# Patient Record
Sex: MALE | Race: WHITE | NOT HISPANIC OR LATINO | Employment: FULL TIME | URBAN - METROPOLITAN AREA
[De-identification: names, ages, dates, MRNs, and addresses within clinical notes are randomized per-mention and may not be internally consistent; named-entity substitution may affect disease eponyms.]

---

## 2017-02-16 ENCOUNTER — APPOINTMENT (OUTPATIENT)
Dept: CT IMAGING | Facility: HOSPITAL | Age: 32
End: 2017-02-16

## 2017-02-16 ENCOUNTER — HOSPITAL ENCOUNTER (EMERGENCY)
Facility: HOSPITAL | Age: 32
Discharge: HOME | End: 2017-02-16
Attending: EMERGENCY MEDICINE | Admitting: EMERGENCY MEDICINE

## 2017-02-16 ENCOUNTER — APPOINTMENT (OUTPATIENT)
Dept: GENERAL RADIOLOGY | Facility: HOSPITAL | Age: 32
End: 2017-02-16

## 2017-02-16 VITALS
HEIGHT: 69 IN | OXYGEN SATURATION: 97 % | SYSTOLIC BLOOD PRESSURE: 133 MMHG | RESPIRATION RATE: 18 BRPM | HEART RATE: 77 BPM | DIASTOLIC BLOOD PRESSURE: 90 MMHG | TEMPERATURE: 97.2 F | WEIGHT: 130 LBS | BODY MASS INDEX: 19.26 KG/M2

## 2017-02-16 DIAGNOSIS — F10.239 ALCOHOL DEPENDENCE WITH WITHDRAWAL WITH COMPLICATION (HCC): ICD-10-CM

## 2017-02-16 DIAGNOSIS — R55 CONVULSIVE SYNCOPE: Primary | ICD-10-CM

## 2017-02-16 LAB
ALBUMIN SERPL-MCNC: 4.2 G/DL (ref 3.5–5.2)
ALBUMIN/GLOB SERPL: 1.2 G/DL
ALP SERPL-CCNC: 64 U/L (ref 39–117)
ALT SERPL W P-5'-P-CCNC: 48 U/L (ref 1–41)
AMPHET+METHAMPHET UR QL: NEGATIVE
ANION GAP SERPL CALCULATED.3IONS-SCNC: 13.4 MMOL/L
AST SERPL-CCNC: 74 U/L (ref 1–40)
BACTERIA UR QL AUTO: ABNORMAL /HPF
BARBITURATES UR QL SCN: NEGATIVE
BASOPHILS # BLD AUTO: 0.02 10*3/MM3 (ref 0–0.2)
BASOPHILS NFR BLD AUTO: 0.4 % (ref 0–1.5)
BENZODIAZ UR QL SCN: NEGATIVE
BILIRUB SERPL-MCNC: 0.8 MG/DL (ref 0.1–1.2)
BILIRUB UR QL STRIP: NEGATIVE
BUN BLD-MCNC: 10 MG/DL (ref 6–20)
BUN/CREAT SERPL: 13.3 (ref 7–25)
CALCIUM SPEC-SCNC: 9.4 MG/DL (ref 8.6–10.5)
CANNABINOIDS SERPL QL: NEGATIVE
CHLORIDE SERPL-SCNC: 94 MMOL/L (ref 98–107)
CLARITY UR: CLEAR
CO2 SERPL-SCNC: 25.6 MMOL/L (ref 22–29)
COCAINE UR QL: NEGATIVE
COLOR UR: ABNORMAL
CREAT BLD-MCNC: 0.75 MG/DL (ref 0.76–1.27)
D DIMER PPP FEU-MCNC: 0.44 MCGFEU/ML (ref 0–0.49)
DEPRECATED RDW RBC AUTO: 44.9 FL (ref 37–54)
EOSINOPHIL # BLD AUTO: 0.2 10*3/MM3 (ref 0–0.7)
EOSINOPHIL NFR BLD AUTO: 4.1 % (ref 0.3–6.2)
ERYTHROCYTE [DISTWIDTH] IN BLOOD BY AUTOMATED COUNT: 12.8 % (ref 11.5–14.5)
ETHANOL BLD-MCNC: <10 MG/DL (ref 0–10)
ETHANOL UR QL: <0.01 %
GFR SERPL CREATININE-BSD FRML MDRD: 120 ML/MIN/1.73
GLOBULIN UR ELPH-MCNC: 3.4 GM/DL
GLUCOSE BLD-MCNC: 124 MG/DL (ref 65–99)
GLUCOSE UR STRIP-MCNC: NEGATIVE MG/DL
HCT VFR BLD AUTO: 38.1 % (ref 40.4–52.2)
HGB BLD-MCNC: 13.1 G/DL (ref 13.7–17.6)
HGB UR QL STRIP.AUTO: NEGATIVE
HYALINE CASTS UR QL AUTO: ABNORMAL /LPF
IMM GRANULOCYTES # BLD: 0 10*3/MM3 (ref 0–0.03)
IMM GRANULOCYTES NFR BLD: 0 % (ref 0–0.5)
INR PPP: 1.1 (ref 0.9–1.1)
KETONES UR QL STRIP: ABNORMAL
LEUKOCYTE ESTERASE UR QL STRIP.AUTO: ABNORMAL
LYMPHOCYTES # BLD AUTO: 0.88 10*3/MM3 (ref 0.9–4.8)
LYMPHOCYTES NFR BLD AUTO: 18.2 % (ref 19.6–45.3)
MAGNESIUM SERPL-MCNC: 1.5 MG/DL (ref 1.6–2.6)
MCH RBC QN AUTO: 33.2 PG (ref 27–32.7)
MCHC RBC AUTO-ENTMCNC: 34.4 G/DL (ref 32.6–36.4)
MCV RBC AUTO: 96.7 FL (ref 79.8–96.2)
METHADONE UR QL SCN: NEGATIVE
MONOCYTES # BLD AUTO: 0.51 10*3/MM3 (ref 0.2–1.2)
MONOCYTES NFR BLD AUTO: 10.5 % (ref 5–12)
MUCOUS THREADS URNS QL MICRO: ABNORMAL /HPF
NEUTROPHILS # BLD AUTO: 3.23 10*3/MM3 (ref 1.9–8.1)
NEUTROPHILS NFR BLD AUTO: 66.8 % (ref 42.7–76)
NITRITE UR QL STRIP: NEGATIVE
OPIATES UR QL: NEGATIVE
OXYCODONE UR QL SCN: NEGATIVE
PH UR STRIP.AUTO: 6.5 [PH] (ref 5–8)
PLATELET # BLD AUTO: 170 10*3/MM3 (ref 140–500)
PMV BLD AUTO: 10.4 FL (ref 6–12)
POTASSIUM BLD-SCNC: 3.5 MMOL/L (ref 3.5–5.2)
PROT SERPL-MCNC: 7.6 G/DL (ref 6–8.5)
PROT UR QL STRIP: ABNORMAL
PROTHROMBIN TIME: 13.8 SECONDS (ref 11.7–14.2)
RBC # BLD AUTO: 3.94 10*6/MM3 (ref 4.6–6)
RBC # UR: ABNORMAL /HPF
REF LAB TEST METHOD: ABNORMAL
SODIUM BLD-SCNC: 133 MMOL/L (ref 136–145)
SP GR UR STRIP: 1.02 (ref 1–1.03)
SQUAMOUS #/AREA URNS HPF: ABNORMAL /HPF
UROBILINOGEN UR QL STRIP: ABNORMAL
WBC NRBC COR # BLD: 4.84 10*3/MM3 (ref 4.5–10.7)
WBC UR QL AUTO: ABNORMAL /HPF

## 2017-02-16 PROCEDURE — 87086 URINE CULTURE/COLONY COUNT: CPT | Performed by: EMERGENCY MEDICINE

## 2017-02-16 PROCEDURE — 80307 DRUG TEST PRSMV CHEM ANLYZR: CPT | Performed by: EMERGENCY MEDICINE

## 2017-02-16 PROCEDURE — 83735 ASSAY OF MAGNESIUM: CPT | Performed by: EMERGENCY MEDICINE

## 2017-02-16 PROCEDURE — 81001 URINALYSIS AUTO W/SCOPE: CPT | Performed by: EMERGENCY MEDICINE

## 2017-02-16 PROCEDURE — 85379 FIBRIN DEGRADATION QUANT: CPT | Performed by: EMERGENCY MEDICINE

## 2017-02-16 PROCEDURE — 90791 PSYCH DIAGNOSTIC EVALUATION: CPT

## 2017-02-16 PROCEDURE — 99285 EMERGENCY DEPT VISIT HI MDM: CPT

## 2017-02-16 PROCEDURE — 96365 THER/PROPH/DIAG IV INF INIT: CPT

## 2017-02-16 PROCEDURE — 96375 TX/PRO/DX INJ NEW DRUG ADDON: CPT

## 2017-02-16 PROCEDURE — 96367 TX/PROPH/DG ADDL SEQ IV INF: CPT

## 2017-02-16 PROCEDURE — 96361 HYDRATE IV INFUSION ADD-ON: CPT

## 2017-02-16 PROCEDURE — 85610 PROTHROMBIN TIME: CPT | Performed by: EMERGENCY MEDICINE

## 2017-02-16 PROCEDURE — 71020 HC CHEST PA AND LATERAL: CPT

## 2017-02-16 PROCEDURE — 80053 COMPREHEN METABOLIC PANEL: CPT | Performed by: EMERGENCY MEDICINE

## 2017-02-16 PROCEDURE — 25810000003 DEXTROSE-NACL PER 500 ML: Performed by: EMERGENCY MEDICINE

## 2017-02-16 PROCEDURE — 25010000002 LORAZEPAM PER 2 MG: Performed by: EMERGENCY MEDICINE

## 2017-02-16 PROCEDURE — 25010000002 THIAMINE PER 100 MG: Performed by: EMERGENCY MEDICINE

## 2017-02-16 PROCEDURE — 93010 ELECTROCARDIOGRAM REPORT: CPT | Performed by: INTERNAL MEDICINE

## 2017-02-16 PROCEDURE — 93005 ELECTROCARDIOGRAM TRACING: CPT | Performed by: EMERGENCY MEDICINE

## 2017-02-16 PROCEDURE — 96366 THER/PROPH/DIAG IV INF ADDON: CPT

## 2017-02-16 PROCEDURE — 25010000002 MAGNESIUM SULFATE PER 500 MG OF MAGNESIUM: Performed by: EMERGENCY MEDICINE

## 2017-02-16 PROCEDURE — 70450 CT HEAD/BRAIN W/O DYE: CPT

## 2017-02-16 PROCEDURE — 25010000003 LEVETIRACETAM IN NACL 0.75% 1000 MG/100ML SOLUTION: Performed by: EMERGENCY MEDICINE

## 2017-02-16 PROCEDURE — 85025 COMPLETE CBC W/AUTO DIFF WBC: CPT | Performed by: EMERGENCY MEDICINE

## 2017-02-16 RX ORDER — LIDOCAINE HYDROCHLORIDE AND EPINEPHRINE 10; 10 MG/ML; UG/ML
10 INJECTION, SOLUTION INFILTRATION; PERINEURAL ONCE
Status: COMPLETED | OUTPATIENT
Start: 2017-02-16 | End: 2017-02-16

## 2017-02-16 RX ORDER — LORAZEPAM 2 MG/ML
0.5 INJECTION INTRAMUSCULAR ONCE
Status: COMPLETED | OUTPATIENT
Start: 2017-02-16 | End: 2017-02-16

## 2017-02-16 RX ORDER — OXYCODONE AND ACETAMINOPHEN 10; 325 MG/1; MG/1
1 TABLET ORAL EVERY 6 HOURS PRN
COMMUNITY

## 2017-02-16 RX ORDER — LIDOCAINE HYDROCHLORIDE AND EPINEPHRINE 10; 10 MG/ML; UG/ML
INJECTION, SOLUTION INFILTRATION; PERINEURAL
Status: COMPLETED
Start: 2017-02-16 | End: 2017-02-16

## 2017-02-16 RX ORDER — CHLORDIAZEPOXIDE HYDROCHLORIDE 25 MG/1
25 CAPSULE, GELATIN COATED ORAL 3 TIMES DAILY PRN
Qty: 30 CAPSULE | Refills: 0 | Status: SHIPPED | OUTPATIENT
Start: 2017-02-16

## 2017-02-16 RX ORDER — LEVETIRACETAM 10 MG/ML
1000 INJECTION INTRAVASCULAR ONCE
Status: COMPLETED | OUTPATIENT
Start: 2017-02-16 | End: 2017-02-16

## 2017-02-16 RX ORDER — SODIUM CHLORIDE 0.9 % (FLUSH) 0.9 %
10 SYRINGE (ML) INJECTION AS NEEDED
Status: DISCONTINUED | OUTPATIENT
Start: 2017-02-16 | End: 2017-02-17 | Stop reason: HOSPADM

## 2017-02-16 RX ADMIN — LEVETIRACETAM 1000 MG: 10 INJECTION INTRAVENOUS at 20:07

## 2017-02-16 RX ADMIN — FOLIC ACID 1000 ML/HR: 5 INJECTION, SOLUTION INTRAMUSCULAR; INTRAVENOUS; SUBCUTANEOUS at 17:25

## 2017-02-16 RX ADMIN — LORAZEPAM 0.5 MG: 2 INJECTION, SOLUTION INTRAMUSCULAR; INTRAVENOUS at 20:07

## 2017-02-16 RX ADMIN — SODIUM CHLORIDE 1000 ML: 9 INJECTION, SOLUTION INTRAVENOUS at 15:34

## 2017-02-16 RX ADMIN — LIDOCAINE HYDROCHLORIDE AND EPINEPHRINE 10 ML: 10; 10 INJECTION, SOLUTION INFILTRATION; PERINEURAL at 17:24

## 2017-02-16 NOTE — ED NOTES
Patient states he passed out while at [facility], hematoma present on occipital lobe with mild bleeding. Head wrapped with 4x4 and kerlix.      Clarissa Brice RN  02/16/17 2640

## 2017-02-16 NOTE — ED PROVIDER NOTES
Laceration Repair  Date/Time: 2/16/2017 5:05 PM  Performed by: TAYLOR SUERO  Authorized by: PHIL HANDY   Consent: Verbal consent obtained.  Risks and benefits: risks, benefits and alternatives were discussed  Consent given by: patient  Patient understanding: patient states understanding of the procedure being performed  Required items: required blood products, implants, devices, and special equipment available  Patient identity confirmed: verbally with patient and arm band  Body area: head/neck  Location details: scalp  Laceration length: 5 cm  Foreign bodies: no foreign bodies  Tendon involvement: none  Nerve involvement: none  Vascular damage: no  Anesthesia: local infiltration    Anesthesia:  Anesthesia: local infiltration  Local Anesthetic: lidocaine 1% with epinephrine   Anesthetic total: 10 mL  Sedation:  Patient sedated: no    Preparation: Patient was prepped and draped in the usual sterile fashion.  Irrigation solution: saline  Irrigation method: jet lavage  Amount of cleaning: standard  Debridement: none  Degree of undermining: none  Skin closure: staples (6 staples)  Approximation: close  Approximation difficulty: simple  Patient tolerance: Patient tolerated the procedure well with no immediate complications        Documentation assistance provided by russ Stone for MARILYN Suero. Information recorded by the scribe was done at my direction and has been verified and validated by me.      Francia Stone  02/16/17 1712       JACKY Rodriguez  02/16/17 7186

## 2017-02-16 NOTE — ED NOTES
SYNCOPAL EPISODE AT [facility]. PT FELL DURING THE EPISODE AND HIT HEAD ON FLOOR. PT HAS LACERATION ON BACK OF HEAD.      Fidelina Bullock RN  02/16/17 6867

## 2017-02-16 NOTE — ED PROVIDER NOTES
EMERGENCY DEPARTMENT ENCOUNTER    CHIEF COMPLAINT  Chief Complaint: syncope  History given by:  Patient   History limited by: nothing  Room Number: 17/17  PMD: No Known Provider      HPI:  Pt is a 32 y.o. male who presents s/p syncopal episode, today while shopping, with a laceration to the back of his head. Pt states he remembers reaching for an item on the shelf when his vision became blurred and he fell backward, and he does not remember anything after that. Pt denies chest pain, recent illness or any hx of similar events.     Onset: gradual  Timing: episodic  Intensity/Severity:  moderate  Progression: resolved  Associated Symptoms: blurred vision prior to event  Aggravating Factors: unknown  Alleviating Factors: unkonwn  Previous Episodes: none  Treatment before arrival: none    PAST MEDICAL HISTORY  Active Ambulatory Problems     Diagnosis Date Noted   • No Active Ambulatory Problems     Resolved Ambulatory Problems     Diagnosis Date Noted   • No Resolved Ambulatory Problems     Past Medical History   Diagnosis Date   • Fracture of foot        PAST SURGICAL HISTORY  Past Surgical History   Procedure Laterality Date   • Foot fracture surgery         FAMILY HISTORY  History reviewed. No pertinent family history.    SOCIAL HISTORY  Social History     Social History   • Marital status: Single     Spouse name: N/A   • Number of children: N/A   • Years of education: N/A     Occupational History   • Not on file.     Social History Main Topics   • Smoking status: Never Smoker   • Smokeless tobacco: Not on file   • Alcohol use Yes   • Drug use: No   • Sexual activity: Not on file     Other Topics Concern   • Not on file     Social History Narrative   • No narrative on file       ALLERGIES  Review of patient's allergies indicates no known allergies.    REVIEW OF SYSTEMS  Review of Systems   Constitutional: Negative for chills and fatigue.   HENT: Negative for congestion, rhinorrhea and sore throat.    Eyes: Negative  for pain.   Respiratory: Negative for cough, shortness of breath and wheezing.    Cardiovascular: Negative for chest pain, palpitations and leg swelling.   Gastrointestinal: Negative for abdominal pain, diarrhea, nausea and vomiting.   Genitourinary: Negative for difficulty urinating, dysuria, flank pain and frequency.   Musculoskeletal: Negative for arthralgias, myalgias, neck pain and neck stiffness.   Skin: Positive for wound (laceration to posterior scalp).   Neurological: Positive for syncope. Negative for dizziness, speech difficulty, weakness, light-headedness, numbness and headaches.   Psychiatric/Behavioral: Negative.    All other systems reviewed and are negative.      PHYSICAL EXAM  ED Triage Vitals   Temp Heart Rate Resp BP SpO2   02/16/17 1508 02/16/17 1508 02/16/17 1508 02/16/17 1508 02/16/17 1508   97.2 °F (36.2 °C) 78 18 130/90 97 %      Temp src Heart Rate Source Patient Position BP Location FiO2 (%)   -- -- -- -- --              Physical Exam   Constitutional: He is oriented to person, place, and time and well-developed, well-nourished, and in no distress.   HENT:   Head: Normocephalic. Head is with laceration.   10 cm laceration to the L occipital scalp   Eyes: EOM are normal. Pupils are equal, round, and reactive to light.   Neck: Normal range of motion. Neck supple.   Cardiovascular: Normal rate, regular rhythm and normal heart sounds.    Pulmonary/Chest: Effort normal and breath sounds normal. No respiratory distress.   Abdominal: Soft. There is no tenderness. There is no rebound and no guarding.   Musculoskeletal: Normal range of motion. He exhibits no edema.   Neurological: He is alert and oriented to person, place, and time. He has normal sensation and normal strength.   Skin: Skin is warm and dry. Rash noted.   Diffuse red, patchy, scaly rash consisted with psoriasis    Psychiatric: Mood and affect normal.   Nursing note and vitals reviewed.      LAB RESULTS  Lab Results (last 24 hours)      Procedure Component Value Units Date/Time    CBC & Differential [[id number]] Collected:  02/16/17 1536    Specimen:  Blood Updated:  02/16/17 1554    Narrative:       The following orders were created for panel order CBC & Differential.  Procedure                               Abnormality         Status                     ---------                               -----------         ------                     CBC Auto Differential[[id number]]         Abnormal            Final result                 Please view results for these tests on the individual orders.    Comprehensive Metabolic Panel [[id number]]  (Abnormal) Collected:  02/16/17 1536    Specimen:  Blood Updated:  02/16/17 1610     Glucose 124 (H) mg/dL      BUN 10 mg/dL      Creatinine 0.75 (L) mg/dL      Sodium 133 (L) mmol/L      Potassium 3.5 mmol/L      Chloride 94 (L) mmol/L      CO2 25.6 mmol/L      Calcium 9.4 mg/dL      Total Protein 7.6 g/dL      Albumin 4.20 g/dL      ALT (SGPT) 48 (H) U/L      AST (SGOT) 74 (H) U/L      Alkaline Phosphatase 64 U/L      Total Bilirubin 0.8 mg/dL      eGFR Non African Amer 120 mL/min/1.73      Globulin 3.4 gm/dL      A/G Ratio 1.2 g/dL      BUN/Creatinine Ratio 13.3      Anion Gap 13.4 mmol/L     Protime-INR [[id number]]  (Normal) Collected:  02/16/17 1536    Specimen:  Blood Updated:  02/16/17 1609     Protime 13.8 Seconds      INR 1.10     D-dimer, Quantitative [[id number]]  (Normal) Collected:  02/16/17 1536    Specimen:  Blood Updated:  02/16/17 1609     D-Dimer, Quantitative 0.44 MCGFEU/mL     Narrative:       The Stago D-Dimer test used in conjunction with a clinical pretest probability (PTP) assessment model, has been approved by the FDA to rule out the presence of venous thromboembolism (VTE) in outpatients suspected of deep venous thrombosis (DVT) or pulmonary embolism (PE).     Magnesium [[id number]]  (Abnormal) Collected:  02/16/17 1536    Specimen:  Blood Updated:  02/16/17 1610     Magnesium 1.5 (L) mg/dL     CBC  Auto Differential [[id number]]  (Abnormal) Collected:  02/16/17 1536    Specimen:  Blood Updated:  02/16/17 1554     WBC 4.84 10*3/mm3      RBC 3.94 (L) 10*6/mm3      Hemoglobin 13.1 (L) g/dL      Hematocrit 38.1 (L) %      MCV 96.7 (H) fL      MCH 33.2 (H) pg      MCHC 34.4 g/dL      RDW 12.8 %      RDW-SD 44.9 fl      MPV 10.4 fL      Platelets 170 10*3/mm3      Neutrophil % 66.8 %      Lymphocyte % 18.2 (L) %      Monocyte % 10.5 %      Eosinophil % 4.1 %      Basophil % 0.4 %      Immature Grans % 0.0 %      Neutrophils, Absolute 3.23 10*3/mm3      Lymphocytes, Absolute 0.88 (L) 10*3/mm3      Monocytes, Absolute 0.51 10*3/mm3      Eosinophils, Absolute 0.20 10*3/mm3      Basophils, Absolute 0.02 10*3/mm3      Immature Grans, Absolute 0.00 10*3/mm3     Urinalysis With / Culture If Indicated [[id number]]  (Abnormal) Collected:  02/16/17 1615    Specimen:  Urine from Urine, Clean Catch Updated:  02/16/17 1637     Color, UA Dark Yellow (A)      Appearance, UA Clear      pH, UA 6.5      Specific Gravity, UA 1.022      Glucose, UA Negative      Ketones, UA Trace (A)      Bilirubin, UA Negative      Blood, UA Negative      Protein,  mg/dL (2+) (A)      Leuk Esterase, UA Trace (A)      Nitrite, UA Negative      Urobilinogen, UA 1.0 E.U./dL     Urine Drug Screen [[id number]]  (Normal) Collected:  02/16/17 1615    Specimen:  Urine from Urine, Clean Catch Updated:  02/16/17 1650     Amphet/Methamphet, Screen Negative      Barbiturates Screen, Urine Negative      Benzodiazepine Screen, Urine Negative      Cocaine Screen, Urine Negative      Opiate Screen Negative      THC, Screen, Urine Negative      Methadone Screen, Urine Negative      Oxycodone Screen, Urine Negative     Narrative:       Negative Thresholds For Drugs Screened:     Amphetamines               500 ng/ml   Barbiturates               200 ng/ml   Benzodiazepines            100 ng/ml   Cocaine                    300 ng/ml   Methadone                  300  ng/ml   Opiates                    300 ng/ml   Oxycodone                  100 ng/ml   THC                        50 ng/ml    The Normal Value for all drugs tested is negative. This report includes final unconfirmed screening results to be used for medical treatment purposes only. Unconfirmed results must not be used for non-medical purposes such as employment or legal testing. Clinical consideration should be applied to any drug of abuse test, particulary when unconfirmed results are used.    Urinalysis, Microscopic Only [[id number]]  (Abnormal) Collected:  02/16/17 1615    Specimen:  Urine from Urine, Clean Catch Updated:  02/16/17 1709     RBC, UA None Seen /HPF      WBC, UA 0-2 (A) /HPF      Bacteria, UA Trace (A) /HPF      Squamous Epithelial Cells, UA None Seen /HPF      Hyaline Casts, UA None Seen /LPF      Mucus, UA Small/1+ (A) /HPF      Methodology Manual Light Microscopy     Urine Culture [[id number]] Collected:  02/16/17 1615    Specimen:  Urine from Urine, Clean Catch Updated:  02/16/17 1647    Ethanol [[id number]] Collected:  02/16/17 1735    Specimen:  Blood Updated:  02/16/17 1759     Ethanol <10 mg/dL      Ethanol % <0.010 %       I ordered the above labs and reviewed the results    RADIOLOGY  CT Head Without Contrast   Preliminary Result   No acute intracranial pathology. Soft tissue laceration of the left   prior occipital scalp.   Sclerotic foci in both occipital bones, indeterminate etiology, if   available comparison with old studies otherwise bone scan or MRI may be   performed.                  XR Chest 2 View   Final Result   Negative   I ordered the above noted radiological studies. Interpreted by radiologist.        PROCEDURES  Procedures    EKG         EKG time: 15:51  Rhythm/Rate: NSR, rate 66  P waves and [place]: normal  QRS, axis: normal   ST and T waves: non specific ST wave changes     Interpreted Contemporaneously by me, independently viewed  No previous for comparison    PROGRESS AND  CONSULTS  ED Course     3:32 PM  Ordered magnesium D-dimer,  UDS, UA, Pt-INR, CMP, CBC, CXR, and EKG for further evaluation.     4:53 PM  Ordered EtOH.     5:12 PM  Pt's laceration repaired by JACKY Chappell.     7:09 PM  Pt rechecked and is resting comfortably. All pertinent lab/imaging/EKG findings discussed including elevated LFTs suggestive of EtOH abuse. Pt admit to daily EtOH use with his last drink last night and states interest in help quitting. Discussed with Pt that his syncope could have been result of alcohol withdrawal although he is not exhibiting signs of withdrawal at this time. Pt/family verbalized understanding and agree with plan to have Pt consulted by [facility] and discharge for outpatient follow up. All questions and concerns addressed at this time.     7:24 PM  Ordered keppra and ativan for treatment of possible withdrawal.     10:27 PM  Pt rechecked and is resting comfortably. All pertinent lab/imaging/EKG findings discussed including nothing acute seen on CT. Discussed with Pt family that Pt cannot take librium if he is drinking. Pt/family verbalized understanding and agree with plan to discharge. All questions and concerns addressed at this time.         MEDICAL DECISION MAKING  Results were reviewed/discussed with the patient and they were also made aware of online access. Pt also made aware that some labs, such as cultures, will not be resulted during ER visit and follow up with PMD is necessary.     MDM  Number of Diagnoses or Management Options     Amount and/or Complexity of Data Reviewed  Clinical lab tests: ordered and reviewed (M.5)  Tests in the radiology section of CPT®: ordered and reviewed (CXR: Negative)  Tests in the medicine section of CPT®: ordered and reviewed  Independent visualization of images, tracings, or specimens: yes    Patient Progress  Patient progress: stable         DIAGNOSIS  Final diagnoses:   Convulsive syncope   Alcohol dependence with  withdrawal with complication       DISPOSITION  Disposition: Discharged.    Patient discharged in stable condition.    Reviewed implications of results, diagnosis, meds, responsibility to follow up, warning signs and symptoms of possible worsening, potential complications and reasons to return to ER.    Patient/Family voiced understanding of above instructions.    Discussed plan for discharge, as there is no emergent indication for admission.  Pt/family is agreeable and understands need for follow up and repeat testing.  Pt is aware that discharge does not mean that nothing is wrong but it indicates no emergency is present and they must continue care with follow-up as given below or physician of their choice.     FOLLOW-UP  [facility] PHYSICAN REFERRAL SERVICE  [facility] [id number] [phone]             Medication List      New Prescriptions          chlordiazePOXIDE 25 MG capsule   Commonly known as:  LIBRIUM   Take 1 capsule by mouth 3 (Three) Times a Day As Needed for anxiety.         Stop          oxyCODONE-acetaminophen  MG per tablet   Commonly known as:  PERCOCET             Latest Documented Vital Signs:  As of 10:28 PM  BP- 133/91 HR- 75 Temp- 97.2 °F (36.2 °C) O2 sat- 98%    --  Documentation assistance provided by russ Monroe for Dr. Jacques MD.  Information recorded by the scribe was done at my direction and has been verified and validated by me.         Rachel Monroe  02/16/17 9120       Don Hanna MD  02/16/17 9512

## 2017-02-16 NOTE — ED NOTES
RN overheard family stating that the patient may be going through detox from alcohol, since he used to drink a lot. RN verified information with family member, who states he may be going into DT's, since he had a full seizure when he was at [facility] where he passed out. MD notified.      Clarissa Brice RN  02/16/17 8909

## 2017-02-17 NOTE — CONSULTS
"31 yo white male evaluated in ED (Room#17). Patient's mother at bedside. Patient fell at [employer] earlier today and hit back of head. Access consulted for alcohol abuse and withdrawal. Patient has been living in [place] with his father for the past 20 plus years but mother wanting patient to stay with her in [place] now. Patient states he is \"90% sure that i will stay here and get treatment for my alcohol abuse\". Mother states her step-son lives with her too (court ordered) and is also an alcoholic. Patient has been abusing alcohol for the past 11 years. States he drinks 4-5 shots of liquor daily along with 2 24 ounces of beer. Last drink was last night or this morning per patient. Longest period of sobriety is 6 months. 2 DUI's in the past and subsequent court ordered classes. Patient still does not have his driving license. Patient reports \"shakes\" when not drinking. No hallucinations. Mother believes patient had a seizure today. Patient denies any previous psychiatric history. No SI. No HI. No psychosis. Mother wants patient to go to [facility] For 30 day inpatient treatment. Patient states this is most likely what he will do. CT to be done in ED prior to discharge. Spoke with Dr. Jacques morales plan of care.  "

## 2017-02-18 LAB — BACTERIA SPEC AEROBE CULT: NO GROWTH

## 2017-11-21 ENCOUNTER — HOSPITAL ENCOUNTER (OUTPATIENT)
Dept: HOSPITAL 62 - RAD | Age: 32
End: 2017-11-21
Attending: PHYSICIAN ASSISTANT
Payer: COMMERCIAL

## 2017-11-21 DIAGNOSIS — R94.6: Primary | ICD-10-CM

## 2017-11-21 PROCEDURE — 76536 US EXAM OF HEAD AND NECK: CPT

## 2017-11-21 NOTE — RADIOLOGY REPORT (SQ)
EXAM DESCRIPTION:  U/S THYROID/SFT TISS HD   NECK



COMPLETED DATE/TIME:  11/21/2017 4:32 pm



REASON FOR STUDY:  R94.6 ABNORMAL RESULTS OF THYROID FUNCTION STUDIES R94.6  ABNORMAL RESULTS OF THYR
OID FUNCTION STUDIES



COMPARISON:  None.



TECHNIQUE:  Dynamic and static gray-scale images acquired of the thyroid gland. Selected additional c
olor/power Doppler images recorded.  All images stored to PACS.



LIMITATIONS:  None.



FINDINGS:  RIGHT LOBE: The right lobe measures 4.4 x 1.6 x 1.6 cm.  Mild heterogeneous echotexture.  
There are multiple small nodules.  The largest measures 4 x 3 x 3 mm.

LEFT LOBE: The left lobe measures 4.5 x 1.3 x 1.6 cm.  Mildly heterogeneous echotexture.  Small nodul
es are present.  The largest measures 4 x 2 x 3 mm.

ISTHMUS: Normal size.  Homogeneous echotexture.  No cystic or solid masses.

OTHER: No other significant finding.



IMPRESSION:  Small bilateral pulmonary nodules as described.  No dominant lesions.



TECHNICAL DOCUMENTATION:  JOB ID:  [id number]

 [employer]- All Rights Reserved